# Patient Record
(demographics unavailable — no encounter records)

---

## 2024-12-10 NOTE — HISTORY OF PRESENT ILLNESS
[FreeTextEntry1] : Mr. TODD CANELA is a 73-year-old male who presents for follow up w/ regards to a hx of type 2 dm. The diabetes has been present for about 20+ years. He denies any history of retinopathy or nephropathy. With regard to neuropathy, he denies any neurologic signs and symptoms.   For the diabetes, he is currently taking Metformin 1000mg BiD and Glimepiride 1mg 1/2 tab- refilled by his pcp.  - Prior could not tolerate Mounjaro.   Home glucose monitoring has shown values to be running 100-150 in the AM, and up to 115 at bedtime.  He does deny any hypoglycemia or hypoglycemic signs or symptoms.  Diet: has pasta up to 2x weekly.   POCT A1C returned today at 6.9%.  POCT glucose today at 158 mg/dL. ---> had cereal with milk for breakfast.   He denies polyuria, polydipsia, or any visual changes. He too denies any skin lesions, skin breakdown or non-healing areas of skin. He too denies any podiatric concerns. Ophthalmologic evaluation is up to date- no diabetic changes noted, does have hx of b/l cataracts mild. ____________________________________________________________________________________________________  Additional medical history includes that of CAD NOCA, hyperlipidemia, hypertension, vitamin d deficiency, kidney stone   Additional Medications: Valsartan 160mg, Simvastatin 20mg, Ezetimibe 10mg, ASA 81 mg, Tamsulosin 0.4 mg  Supplements: zinc, iron  PMD in [place] Dr. Craig.  Cardiologist: Dr. Cain Mendoza. Had recent unremarkable nuclear stress test, echo, and carotid doppler.

## 2024-12-10 NOTE — HISTORY OF PRESENT ILLNESS
[FreeTextEntry1] : Mr. TODD CANELA is a 73-year-old male who presents for follow up w/ regards to a hx of type 2 dm. The diabetes has been present for about 20+ years. He denies any history of retinopathy or nephropathy. With regard to neuropathy, he denies any neurologic signs and symptoms.   For the diabetes, he is currently taking Metformin 1000mg BiD and Glimepiride 1mg 1/2 tab- refilled by his pcp.  - Prior could not tolerate Mounjaro.   Home glucose monitoring has shown values to be running 100-150 in the AM, and up to 115 at bedtime.  He does deny any hypoglycemia or hypoglycemic signs or symptoms.  Diet: has pasta up to 2x weekly.   POCT A1C returned today at 6.9%.  POCT glucose today at 158 mg/dL. ---> had cereal with milk for breakfast.   He denies polyuria, polydipsia, or any visual changes. He too denies any skin lesions, skin breakdown or non-healing areas of skin. He too denies any podiatric concerns. Ophthalmologic evaluation is up to date- no diabetic changes noted, does have hx of b/l cataracts mild. ____________________________________________________________________________________________________  Additional medical history includes that of CAD NOCA, hyperlipidemia, hypertension, vitamin d deficiency, kidney stone   Additional Medications: Valsartan 160mg, Simvastatin 20mg, Ezetimibe 10mg, ASA 81 mg, Tamsulosin 0.4 mg  Supplements: zinc, iron  PMD in [place] Dr. Craig.  Cardiologist: Dr. Cain Mendoza. Had recent unremarkable nuclear stress test, echo, and carotid doppler.

## 2024-12-10 NOTE — ADDENDUM
[FreeTextEntry1] : POCT glucose testing and Hemoglobin A1c was carried out today given diabetes diagnosis. Blood will be drawn in office today.  This note was written by Yuri Arenas on 12/10/2024 acting as medical scribe for Dr. Chiki Hartley. I, Dr. Chiki Hartley, have read and attest that all the information, medical decision making and discharge instructions within are true and accurate.

## 2025-02-26 NOTE — HISTORY OF PRESENT ILLNESS
[FreeTextEntry1] : Patient is a 72 y/o male with a PMHx of hypertension, CAD, Type 2 Diabetes Mellitus, hyperlipidemia, hyperkalemia, hyperlipemia, Nephrolithiasis, Vitamin D Deficiency, Iron Deficiency Anemia, Colon Diverticulosis, and Colon Polyps, who presents for concern due to Iron Deficiency Anemia shown on his most recent bloodwork. Bloodwork performed on 12/10/2024, ordered by pt's endocrinologist, Dr. Chiki Hartley, showed a decreased Iron level or 43, and a decreased HGB of 12.2. Pt is currently asymptomatic and feeling well. Last colonoscopy was 2/17/2022, which showed colon diverticulosis. Pt has never had an EGD.

## 2025-02-26 NOTE — PHYSICAL EXAM

## 2025-02-26 NOTE — ASSESSMENT
[FreeTextEntry1] : Referred pt for EGD-Colonoscopy.  Diverticulosis occurs when small, bulging sacs or pouches form on the inner wall of the intestine. These sacs are called diverticula. Most often, these pouches form in the large intestine (colon). They may also occur in the jejunum in the small intestine, although this is less common.   Causes  Diverticulosis is less common in people age 40 and younger. It's more common in older adults. About half of Americans over age 60 have this condition. Most people will have it by age 80.    No one knows exactly what causes these pouches to form.    For many years, it was thought that eating a low-fiber diet may play a role. Not eating enough fiber can cause constipation (hard stools). Straining to pass stools (feces) increases the pressure in the colon or intestines. This may cause the pouches to form at weak spots in the colon wall. However, whether a low-fiber diet leads to this problem is not well proven.    Other possible risk factors that are also not well proven are lack of exercise and obesity.    Eating nuts, popcorn, or corn does not appear to lead to inflammation of these pouches (diverticulitis).    Symptoms  Most people with diverticulosis have no symptoms.    When symptoms occur, they may include:    Pain and cramps in your stomach especially in the left lower abdomen  Constipation (sometimes diarrhea)  Bloating or gas  Not feeling hungry and not eating  You may notice small amounts of blood in your stools or on toilet paper. Rarely, more severe bleeding may occur.    Exams and Tests  Diverticulosis is often found during an exam for another health problem. For example, it is often discovered during a colonoscopy.    If you do have symptoms, you may have one or more of the following tests:    Blood tests to see if you have an infection or have lost too much blood  CT scan or ultrasound of the abdomen if you have bleeding, loose stools, or pain  A colonoscopy is needed to make the diagnosis:    A colonoscopy is an exam that views the inside of the colon and rectum. This test should not be done when you are having symptoms of acute diverticulitis.  A small camera attached to a tube can reach the length of the colon.  Angiography:    Angiography is an imaging test that uses x-rays and a special dye to see inside the blood vessels.  This test may be used if the area of bleeding is not seen during a colonoscopy.  Treatment  Because most people have no symptoms, most of the time, no treatment is needed.    Your health care provider may recommend getting more fiber in your diet. A high-fiber diet has many health benefits. Most people don't get enough fiber. To help prevent constipation, you should:    Eat plenty of whole grains, beans, fruits, and vegetables. Limit processed foods.  Drink plenty of fluids.  Get regular exercise.  Talk with your provider about taking a fiber supplement.  You should avoid NSAIDs such as aspirin, ibuprofen (Motrin), and naproxen (Aleve). These medicines can make bleeding more likely.    For bleeding that does not stop or recurs:    Colonoscopy may be used to inject medicines or burn a certain area in the intestine to stop the bleeding.  Angiography may be used to infuse medicines or block off a blood vessel.  If bleeding does not stop or recurs many times, removal of a section of the colon may be needed.    [place] (Prognosis)  Most people who have diverticulosis have no symptoms. Once these pouches have formed, you will have them for life.    Up to 25% of people with the condition will develop diverticulitis. This occurs when small pieces of stool become trapped in the pouches, causing infection or swelling.    Possible Complications  More serious problems that may develop include:    Abnormal connections that form between parts of the colon or between the colon and another part of the body (fistula)  Hole or tear in the colon (perforation)  Narrowed area in the colon (stricture)  Pockets filled with pus or infection (abscess)  When to Contact a Medical Professional  Call your provider if symptoms of diverticulitis occur.   An upper GI endoscopy or EGD (esophagogastroduodenoscopy) is a procedure to diagnose and treat problems in your upper GI (gastrointestinal) tract.  The upper GI tract includes your food pipe (esophagus), stomach, and the first part of your small intestine (the duodenum).  This procedure is done using a long, flexible tube called an endoscope. The tube has a tiny light and video camera on one end. The tube is put into your mouth and throat. Then it is slowly pushed through your esophagus and stomach, and into your duodenum. Video images from the tube are seen on a monitor.  Small tools may also be inserted into the endoscope. These tools can be used to:  Take tissue samples for a biopsy Remove things such as food that may be stuck in the upper GI tract Inject air or fluid Stop bleeding do procedures such as endoscopic surgery, laser therapy, or open (dilate) a narrowed area  A colonoscopy is an exam of the lower part of the gastrointestinal tract, which is called the colon or large intestine (bowel). Colonoscopy is a safe procedure that provides information that other tests may not be able to give.  Colonoscopy is performed by inserting a device called a colonoscope into the anus and advancing through the entire colon. The procedure generally takes between 20 minutes and one hour.  Other tests that are sometimes used to screen for colon cancer, like virtual colonoscopy (also called CT colonography), were discussed separately.  REASONS FOR COLONOSCOPY:  The most common reasons for colonoscopy are:  1. To screen for colon polyps (growths of tissue in the colon) or colon cancer  2. Rectal bleeding  3. A change in bowel habits, like persistent diarrhea  4. Iron deficiency anemia (a decrease in blood count due to loss of iron)  5. A family history of colon cancer  6. A personal history of colon polyps or colon cancer  7. Chronic, unexplained abdominal or rectal pain  8. An abnormal X-Ray exam, like a barium enema or CT scan.   COLONOSCOPY PREPARATION: Before colonoscopy, your colon must be completely cleaned out so that the doctor can see any abnormal areas. This is vitally important to increase the chances that your doctor will identify abnormalities in your colon. If your colon is not completely cleaned out, the chances your doctor will miss abnormalities increases. Your doctor's office will provide specific instructions about how you should prepare for your colonoscopy. Be sure to read these instructions as soon as you get them so you will know how to take the preparation and whether you need to make any changes to your medications or diet. If you have questions, call the doctor's office in advance.  I spent 35 minutes reviewing the patient's records prior to arrival, with the patient, and reviewing records after the visit. All prior testing reviewed at length. Patient verbalized understanding of all information provided. All questions answered and reviewed.  Robert Brunner, MD

## 2025-02-27 NOTE — PHYSICAL EXAM
[No Acute Distress] : no acute distress [Normal Oropharynx] : normal oropharynx [Normal Appearance] : normal appearance [No Neck Mass] : no neck mass [Normal Rate/Rhythm] : normal rate/rhythm [Normal S1, S2] : normal s1, s2 [No Murmurs] : no murmurs [No Resp Distress] : no resp distress [Clear to Auscultation Bilaterally] : clear to auscultation bilaterally [No Abnormalities] : no abnormalities [Benign] : benign [Normal Gait] : normal gait [No Clubbing] : no clubbing [No Cyanosis] : no cyanosis [No Edema] : no edema [Normal Color/ Pigmentation] : normal color/ pigmentation [No Focal Deficits] : no focal deficits [Oriented x3] : oriented x3 [Normal Affect] : normal affect [Normal to Percussion] : normal to percussion [No HSM] : no hsm [TextBox_2] : Overweight

## 2025-02-27 NOTE — ASSESSMENT
[FreeTextEntry1] : The pathophysiology as well as medical implications of untreated obstructive sleep apnea syndrome were discussed with this patient. Treatment options including CPAP therapy, Inspire,  mandibular advancement device and weight loss were also discussed. [facility] HSS Likely benefit from program of weight loss and conditioning. Cardiology evaluation. Patient will follow-up post sleep study for further recommendations or sooner on appearing basis. Do not see indication for bronchodilator therapy at this time.

## 2025-02-27 NOTE — HISTORY OF PRESENT ILLNESS
[Never] : never [Awakes with Dry Mouth] : awakes with dry mouth [Fatigue] : fatigue [Frequent Nocturnal Awakening] : frequent nocturnal awakening [Snoring] : snoring [Unintentional Sleep while Inactive] : unintentional sleep while inactive [TextBox_4] : TODD CANELA is a 73 year old  M referred for pulmonary evaluation for shortness of breath.  Difficulty with SOB. Noted SOB past few months. Variable. BLAIR if walks fast or climbs steps. BLAIR 40 steps.  Has seen cardiology going for testing.  No significant cough. Positive issue with saliva in throat. ? wheezing. No CP. No CXR or CT.  Overweight no change.   Past pulmonary history. N Occupational Exposure. N Family history of pulmonary disease. N Recent travel [place] last year.  Pets N [Awakes Unrefreshed] : does not awaken unrefreshed [Awakes with Headache] : does not awaken with headache [Tired while Driving] : not tired while driving

## 2025-02-27 NOTE — ASSESSMENT
[FreeTextEntry1] : The pathophysiology as well as medical implications of untreated obstructive sleep apnea syndrome were discussed with this patient. Treatment options including CPAP therapy, Inspire,  mandibular advancement device and weight loss were also discussed. [facility] HSS Likely benefit from program of weight loss and conditioning. Cardiology evaluation. Patient will follow-up post sleep study for further recommendations or sooner on appearing basis. Do not see indication for bronchodilator therapy at this time.

## 2025-02-27 NOTE — PROCEDURE
[FreeTextEntry1] : 02/26/2025 Pulmonary function testing There is a mild ventilatory impairment in a restrictive pattern.Normal Lung Volumes. There is a mild diffusion impairment.Corrects to normal with lung volume correction  Chest radiograph February 2025 reported negative chest.

## 2025-02-27 NOTE — DISCUSSION/SUMMARY
[FreeTextEntry1] : BLAIR.  Do not see evidence of significant underlying parenchymal lung or airways disease to explain exertional dyspnea.  Likely not pulmonary in origin.  Cannot exclude cardiac.  May be related to weight and conditioning. Likely suffers from PERLITA Restrictive physiology likely related to body habitus.

## 2025-03-12 NOTE — REASON FOR VISIT
[Follow-Up] : a follow-up visit [Shortness of Breath] : shortness of breath [Sleep Apnea] : sleep apnea

## 2025-03-13 NOTE — DISCUSSION/SUMMARY
[FreeTextEntry1] : PERLITA Very perlita AHI 79 RDI 84 and 44.3 percent desaturations BLAIR.  Do not see evidence of significant underlying parenchymal lung or airways disease to explain  exertional dyspnea.  Likely not pulmonary in origin.  Cannot exclude cardiac.  May be related to weight and conditioning.  Restrictive physiology likely related to body habitus.

## 2025-03-13 NOTE — PHYSICAL EXAM
[No Acute Distress] : no acute distress [Normal Oropharynx] : normal oropharynx [Normal Appearance] : normal appearance [No Neck Mass] : no neck mass [Normal Rate/Rhythm] : normal rate/rhythm [Normal S1, S2] : normal s1, s2 [No Murmurs] : no murmurs [No Resp Distress] : no resp distress [Clear to Auscultation Bilaterally] : clear to auscultation bilaterally [Normal to Percussion] : normal to percussion [No Abnormalities] : no abnormalities [Benign] : benign [No HSM] : no hsm [Normal Gait] : normal gait [No Clubbing] : no clubbing [No Cyanosis] : no cyanosis [No Edema] : no edema [Normal Color/ Pigmentation] : normal color/ pigmentation [No Focal Deficits] : no focal deficits [Oriented x3] : oriented x3 [Normal Affect] : normal affect [TextBox_2] : Overweight

## 2025-03-13 NOTE — HISTORY OF PRESENT ILLNESS
[Never] : never [Awakes with Dry Mouth] : awakes with dry mouth [Fatigue] : fatigue [Frequent Nocturnal Awakening] : frequent nocturnal awakening [Snoring] : snoring [Unintentional Sleep while Inactive] : unintentional sleep while inactive [TextBox_4] : Here for f/u  had recent 2-night HSS seeing Dr Cain Mendoza and has a Holter had echo told ok has dry mouth Difficulty with SOB on and off Noted SOB past few months. Variable. BLAIR if walks fast or climbs steps. BLAIR 40 steps.  No significant cough. Positive issue with saliva in throat. ? wheezing. No CP. No CXR or CT.  Overweight no change.   Past pulmonary history. N  [Awakes Unrefreshed] : does not awaken unrefreshed [Awakes with Headache] : does not awaken with headache [Tired while Driving] : not tired while driving

## 2025-03-13 NOTE — ASSESSMENT
[FreeTextEntry1] : will order cpap titration will f/u post titration Likely benefit from program of weight loss and conditioning. Cardiology f/u

## 2025-03-13 NOTE — PROCEDURE
[FreeTextEntry1] : 2-night HSS reviewed and treatment options  02/26/2025 Pulmonary function testing There is a mild ventilatory impairment in a restrictive pattern.Normal Lung Volumes. There is a mild diffusion impairment.Corrects to normal with lung volume correction  Chest radiograph February 2025 reported negative chest.

## 2025-05-03 NOTE — HISTORY OF PRESENT ILLNESS
[FreeTextEntry1] : Mr. TODD CANELA is a 73-year-old male who presents for follow up w/ regards to a hx of type 2 dm. The diabetes has been present for about 20+ years. He denies any history of retinopathy or nephropathy. With regard to neuropathy, he denies any neurologic signs and symptoms.   Additional medical history includes that of CAD NOCA, hyperlipidemia, hypertension, vitamin d deficiency, kidney stone   For the diabetes, he is currently taking Metformin 1000mg BiD and Glimepiride 1mg 1/2 tab- refilled by his pcp. Overall, tolerating the medications well. - Prior could not tolerate Mounjaro.   Home glucose monitoring has shown values to be running  He does deny any hypoglycemia or hypoglycemic signs or symptoms.  Diet: has pasta up to 2x weekly.   POCT A1C returned today at 7.2 %. Previously returned at 7% in December 2024. POCT glucose today at 148  mg/dL.  He denies polyuria, polydipsia, or any visual changes. He too denies any skin lesions, skin breakdown or non-healing areas of skin. He too denies any podiatric concerns. Ophthalmologic evaluation is up to date- no diabetic changes noted, does have hx of b/l cataracts mild. ____________________________________________________________________________________________________  Additional Medications: Valsartan 160mg, Simvastatin 20mg, Ezetimibe 10mg, ASA 81 mg, Tamsulosin 0.4 mg  Supplements: zinc, iron  PMD in [place] Dr. Craig.  Cardiologist: Dr. Cain Mendoza. Had recent unremarkable nuclear stress test, echo, and carotid doppler.

## 2025-05-03 NOTE — HISTORY OF PRESENT ILLNESS
[FreeTextEntry1] : Mr. TODD CANELA is a 73-year-old male who presents for follow up w/ regards to a hx of type 2 dm. The diabetes has been present for about 20+ years. He denies any history of retinopathy or nephropathy. With regard to neuropathy, he denies any neurologic signs and symptoms.   Additional medical history includes that of CAD NOCA, hyperlipidemia, hypertension, vitamin d deficiency, kidney stone   For the diabetes, he is currently taking Metformin 1000mg BiD and Glimepiride 1mg 1/2 tab- refilled by his pcp. Overall, tolerating the medications well. - Prior could not tolerate Mounjaro.   Home glucose monitoring has shown values to be running  He does deny any hypoglycemia or hypoglycemic signs or symptoms.  Diet: has pasta up to 2x weekly.   POCT A1C returned today at 7.2 %. Previously returned at 7% in December 2024. POCT glucose today at 148  mg/dL.  He denies polyuria, polydipsia, or any visual changes. He too denies any skin lesions, skin breakdown or non-healing areas of skin. He too denies any podiatric concerns. Ophthalmologic evaluation is up to date- no diabetic changes noted, does have hx of b/l cataracts mild. ____________________________________________________________________________________________________  Additional Medications: Valsartan 160mg, Simvastatin 20mg, Ezetimibe 10mg, ASA 81 mg, Tamsulosin 0.4 mg  Supplements: zinc, iron  PMD in [place] Dr. Craig.  Cardiologist: Dr. Cain Mendoza. Had recent unremarkable nuclear stress test, echo, and carotid doppler.

## 2025-05-09 NOTE — HISTORY OF PRESENT ILLNESS
[Never] : never [Awakes with Dry Mouth] : awakes with dry mouth [Fatigue] : fatigue [Frequent Nocturnal Awakening] : frequent nocturnal awakening [Snoring] : snoring [Unintentional Sleep while Inactive] : unintentional sleep while inactive [TextBox_4] : Had titration study Needs BiPAP No other significant change in status    [Awakes Unrefreshed] : does not awaken unrefreshed [Awakes with Headache] : does not awaken with headache [Tired while Driving] : not tired while driving

## 2025-05-09 NOTE — PROCEDURE
[FreeTextEntry1] : discussed titration study  02/26/2025 Pulmonary function testing There is a mild ventilatory impairment in a restrictive pattern.Normal Lung Volumes. There is a mild diffusion impairment.Corrects to normal with lung volume correction  Chest radiograph February 2025 reported negative chest.

## 2025-05-09 NOTE — ASSESSMENT
[FreeTextEntry1] : start bipap ResMed with Ipap 14 Epap10 PS 2 with a dream silicone pillow to fit r/t 2 months for compliance Likely benefit from program of weight loss and conditioning. Cardiology f/u

## 2025-07-30 NOTE — DISCUSSION/SUMMARY
[FreeTextEntry1] : PERLITA Very perlita AHI 79 RDI 84 and 44.3 percent desaturations titrated to bipap BLAIR.  Do not see evidence of significant underlying parenchymal lung or airways disease to explain  exertional dyspnea.  Likely not pulmonary in origin.  Cannot exclude cardiac.  May be related to weight and conditioning.  Restrictive physiology likely related to body habitus.

## 2025-07-30 NOTE — HISTORY OF PRESENT ILLNESS
[FreeTextEntry1] : Patient is a 72 y/o male with a PMHx of hypertension, CAD, Type 2 Diabetes Mellitus, hyperlipidemia, hyperkalemia, hyperlipemia, Nephrolithiasis, Vitamin D Deficiency, Iron Deficiency Anemia, GERD, Gastritis, Reactive Gastropathy, Sigmoid Diverticulosis, and Colon Polyps, currently on Omeprazole 40 mg once daily, who presents c/o bloating. Pt takes iron pills 50 mg BID. He denies any constipation. Pt had blood work today by his endocrinologist which is still pending.  Bloodwork performed on 12/10/2024, ordered by pt's endocrinologist, Dr. Chiki Hartley, showed a decreased Iron level or 43, and a decreased HGB of 12.2.  Last ECO was performed on 4/8/2025. EGD showed gastritis, reactive gastropathy, and was negative for H. Pylori. Colonoscopy showed Sigmoid Diverticulosis. Pt was instructed to f/u in 5 years for next Colonoscopy.

## 2025-07-30 NOTE — ASSESSMENT
[FreeTextEntry1] : Pt was advised to stop Omeprazole  Famotidine 40 mg was ordered for pt Will f/u with CBC and FE from endocrinologist  continue taking iron pills  Pt was advised to f/u in office in 2 weeks    1. GI HEALTH   FODMAP stands for fermentable oligosaccharides, disaccharides, monosaccharides, and polyols, which are short-chain carbohydrates (sugars) that the small intestine absorbs poorly. Some people experience digestive distress after eating them. Symptoms include: Cramping Diarrhea Constipation Abdominal Bloating Gas and Flatulence     2. EXERCISE   Physical activity increases blood flow to the muscles in the digestive system, which massage our food along the digestive tract, a process known as peristalsis, causing them to work more quickly and effectively. Research also suggests that exercise affects the balance of bacteria in the gut.     3. BLOATING   Abdominal cramping, excessive gas, and sharp pains in the abdomen can all be symptoms a patient endures when suffering from stomach bloating. Stomach bloating occurs when gas fails to leave the body either through flatulence or belching, but instead builds up inside the intestines or stomach and results in mild to severe bloating. Bloating is related to stress levels, gastrointestinal issues, smoking, and processed foods. Fatty foods, such as hamburgers, fried chicken, and desserts, often contain saturated and trans fats, which may increase the full feeling a patient may experience.  A low acid/reflux diet was discussed in great detail including not smoking, not drinking alcohol, and not consuming foods that irritate the esophagus. It is helpful to eat small meals throughout the day instead of large meals. You should avoid eating before bedtime or lying down after you eat. It can be helpful to raise the head of your bed six inches. Additionally, you should maintain a healthy weight and good posture. The patient was given written material to take home and review.  Diverticulosis occurs when small, bulging sacs or pouches form on the inner wall of the intestine. These sacs are called diverticula. Most often, these pouches form in the large intestine (colon). They may also occur in the jejunum in the small intestine, although this is less common.     Causes   Diverticulosis is less common in people age 40 and younger. It's more common in older adults. About half of Americans over age 60 have this condition. Most people will have it by age 80.       No one knows exactly what causes these pouches to form.       For many years, it was thought that eating a low-fiber diet may play a role. Not eating enough fiber can cause constipation (hard stools). Straining to pass stools (feces) increases the pressure in the colon or intestines. This may cause the pouches to form at weak spots in the colon wall. However, whether a low-fiber diet leads to this problem is not well proven.       Other possible risk factors that are also not well proven are lack of exercise and obesity.       Eating nuts, popcorn, or corn does not appear to lead to inflammation of these pouches (diverticulitis).       Symptoms   Most people with diverticulosis have no symptoms.       When symptoms occur, they may include:       Pain and cramps in your stomach especially in the left lower abdomen   Constipation (sometimes diarrhea)   Bloating or gas   Not feeling hungry and not eating   You may notice small amounts of blood in your stools or on toilet paper. Rarely, more severe bleeding may occur.       Exams and Tests   Diverticulosis is often found during an exam for another health problem. For example, it is often discovered during a colonoscopy.       If you do have symptoms, you may have one or more of the following tests:       Blood tests to see if you have an infection or have lost too much blood   CT scan or ultrasound of the abdomen if you have bleeding, loose stools, or pain   A colonoscopy is needed to make the diagnosis:       A colonoscopy is an exam that views the inside of the colon and rectum. This test should not be done when you are having symptoms of acute diverticulitis.   A small camera attached to a tube can reach the length of the colon.   Angiography:       Angiography is an imaging test that uses x-rays and a special dye to see inside the blood vessels.   This test may be used if the area of bleeding is not seen during a colonoscopy.   Treatment   Because most people have no symptoms, most of the time, no treatment is needed.       Your health care provider may recommend getting more fiber in your diet. A high-fiber diet has many health benefits. Most people don't get enough fiber. To help prevent constipation, you should:       Eat plenty of whole grains, beans, fruits, and vegetables. Limit processed foods.   Drink plenty of fluids.   Get regular exercise.   Talk with your provider about taking a fiber supplement.   You should avoid NSAIDs such as aspirin, ibuprofen (Motrin), and naproxen (Aleve). These medicines can make bleeding more likely.       For bleeding that does not stop or recurs:       Colonoscopy may be used to inject medicines or burn a certain area in the intestine to stop the bleeding.   Angiography may be used to infuse medicines or block off a blood vessel.   If bleeding does not stop or recurs many times, removal of a section of the colon may be needed.       [place] (Prognosis)   Most people who have diverticulosis have no symptoms. Once these pouches have formed, you will have them for life.       Up to 25% of people with the condition will develop diverticulitis. This occurs when small pieces of stool become trapped in the pouches, causing infection or swelling.       Possible Complications   More serious problems that may develop include:       Abnormal connections that form between parts of the colon or between the colon and another part of the body (fistula)   Hole or tear in the colon (perforation)   Narrowed area in the colon (stricture)   Pockets filled with pus or infection (abscess)   When to Contact a Medical Professional   Call your provider if symptoms of diverticulitis occur.   I spent 35 minutes reviewing the patient's records prior to arrival, with the patient, and reviewing records after the visit. All prior testing reviewed at length. Patient verbalized understanding of all information provided. All questions answered and reviewed.  Robert Brunner, MD

## 2025-07-30 NOTE — HISTORY OF PRESENT ILLNESS
[Never] : never [Awakes with Dry Mouth] : awakes with dry mouth [Fatigue] : fatigue [Frequent Nocturnal Awakening] : frequent nocturnal awakening [Snoring] : snoring [Unintentional Sleep while Inactive] : unintentional sleep while inactive [TextBox_4] : using [employer] [facility] for f/u with new BiPAP machine with nasal pillows large using regularly with benefit did not bring on vacation very dry mouth and DME sent medium pillows instead of large No other significant change in status    [Awakes Unrefreshed] : does not awaken unrefreshed [Awakes with Headache] : does not awaken with headache [Tired while Driving] : not tired while driving

## 2025-07-30 NOTE — PHYSICAL EXAM

## 2025-07-30 NOTE — PROCEDURE
[FreeTextEntry1] : bipap data downloaded and discussed with patient  02/26/2025 Pulmonary function testing There is a mild ventilatory impairment in a restrictive pattern.Normal Lung Volumes. There is a mild diffusion impairment.Corrects to normal with lung volume correction  Chest radiograph February 2025 reported negative chest.

## 2025-07-30 NOTE — ASSESSMENT
[FreeTextEntry1] : cont. bipap ResMed with Ipap 14 Epap10 PS 2 with a dream silicone pillow to fit Patient compliant to bipap therapy and having positive clinical response to treatment. Continue present settings. Likely benefit from program of weight loss and conditioning. failed [place] in past, bad side effects gave sample of ResMed x30 i to try to increase humidification will get large nasal pillows r/t 6 months for f/u

## 2025-07-30 NOTE — ASSESSMENT
[FreeTextEntry1] : Pt was advised to stop Omeprazole  Famotidine 40 mg was ordered for pt Will f/u with CBC and FE from endocrinologist  continue taking iron pills  Pt was advised to f/u in office in 2 weeks    1. GI HEALTH   FODMAP stands for fermentable oligosaccharides, disaccharides, monosaccharides, and polyols, which are short-chain carbohydrates (sugars) that the small intestine absorbs poorly. Some people experience digestive distress after eating them. Symptoms include: Cramping Diarrhea Constipation Abdominal Bloating Gas and Flatulence     2. EXERCISE   Physical activity increases blood flow to the muscles in the digestive system, which massage our food along the digestive tract, a process known as peristalsis, causing them to work more quickly and effectively. Research also suggests that exercise affects the balance of bacteria in the gut.     3. BLOATING   Abdominal cramping, excessive gas, and sharp pains in the abdomen can all be symptoms a patient endures when suffering from stomach bloating. Stomach bloating occurs when gas fails to leave the body either through flatulence or belching, but instead builds up inside the intestines or stomach and results in mild to severe bloating. Bloating is related to stress levels, gastrointestinal issues, smoking, and processed foods. Fatty foods, such as hamburgers, fried chicken, and desserts, often contain saturated and trans fats, which may increase the full feeling a patient may experience.  A low acid/reflux diet was discussed in great detail including not smoking, not drinking alcohol, and not consuming foods that irritate the esophagus. It is helpful to eat small meals throughout the day instead of large meals. You should avoid eating before bedtime or lying down after you eat. It can be helpful to raise the head of your bed six inches. Additionally, you should maintain a healthy weight and good posture. The patient was given written material to take home and review.  Diverticulosis occurs when small, bulging sacs or pouches form on the inner wall of the intestine. These sacs are called diverticula. Most often, these pouches form in the large intestine (colon). They may also occur in the jejunum in the small intestine, although this is less common.     Causes   Diverticulosis is less common in people age 40 and younger. It's more common in older adults. About half of Americans over age 60 have this condition. Most people will have it by age 80.       No one knows exactly what causes these pouches to form.       For many years, it was thought that eating a low-fiber diet may play a role. Not eating enough fiber can cause constipation (hard stools). Straining to pass stools (feces) increases the pressure in the colon or intestines. This may cause the pouches to form at weak spots in the colon wall. However, whether a low-fiber diet leads to this problem is not well proven.       Other possible risk factors that are also not well proven are lack of exercise and obesity.       Eating nuts, popcorn, or corn does not appear to lead to inflammation of these pouches (diverticulitis).       Symptoms   Most people with diverticulosis have no symptoms.       When symptoms occur, they may include:       Pain and cramps in your stomach especially in the left lower abdomen   Constipation (sometimes diarrhea)   Bloating or gas   Not feeling hungry and not eating   You may notice small amounts of blood in your stools or on toilet paper. Rarely, more severe bleeding may occur.       Exams and Tests   Diverticulosis is often found during an exam for another health problem. For example, it is often discovered during a colonoscopy.       If you do have symptoms, you may have one or more of the following tests:       Blood tests to see if you have an infection or have lost too much blood   CT scan or ultrasound of the abdomen if you have bleeding, loose stools, or pain   A colonoscopy is needed to make the diagnosis:       A colonoscopy is an exam that views the inside of the colon and rectum. This test should not be done when you are having symptoms of acute diverticulitis.   A small camera attached to a tube can reach the length of the colon.   Angiography:       Angiography is an imaging test that uses x-rays and a special dye to see inside the blood vessels.   This test may be used if the area of bleeding is not seen during a colonoscopy.   Treatment   Because most people have no symptoms, most of the time, no treatment is needed.       Your health care provider may recommend getting more fiber in your diet. A high-fiber diet has many health benefits. Most people don't get enough fiber. To help prevent constipation, you should:       Eat plenty of whole grains, beans, fruits, and vegetables. Limit processed foods.   Drink plenty of fluids.   Get regular exercise.   Talk with your provider about taking a fiber supplement.   You should avoid NSAIDs such as aspirin, ibuprofen (Motrin), and naproxen (Aleve). These medicines can make bleeding more likely.       For bleeding that does not stop or recurs:       Colonoscopy may be used to inject medicines or burn a certain area in the intestine to stop the bleeding.   Angiography may be used to infuse medicines or block off a blood vessel.   If bleeding does not stop or recurs many times, removal of a section of the colon may be needed.       [place] (Prognosis)   Most people who have diverticulosis have no symptoms. Once these pouches have formed, you will have them for life.       Up to 25% of people with the condition will develop diverticulitis. This occurs when small pieces of stool become trapped in the pouches, causing infection or swelling.       Possible Complications   More serious problems that may develop include:       Abnormal connections that form between parts of the colon or between the colon and another part of the body (fistula)   Hole or tear in the colon (perforation)   Narrowed area in the colon (stricture)   Pockets filled with pus or infection (abscess)   When to Contact a Medical Professional   Call your provider if symptoms of diverticulitis occur.   I spent 35 minutes reviewing the patient's records prior to arrival, with the patient, and reviewing records after the visit. All prior testing reviewed at length. Patient verbalized understanding of all information provided. All questions answered and reviewed.  Robert Brunner, MD

## 2025-07-30 NOTE — PHYSICAL EXAM

## 2025-08-13 NOTE — HISTORY OF PRESENT ILLNESS
[FreeTextEntry1] : Mr. TODD CANELA is a 73-year-old male who presents for follow up w/ regards to a hx of type 2 dm. The diabetes has been present for about 20+ years. He denies any history of retinopathy or nephropathy. With regard to neuropathy, he denies any neurologic signs and symptoms.   Additional medical history includes that of CAD NOCA, hyperlipidemia, hypertension, vitamin d deficiency, kidney stone, PERLITA (on CPAP) - Recently underwent a procedure to remove kidney stone. Currently has no kidney stones.  For the diabetes, he is currently taking Metformin 1000 mg BiD and Glimepiride 1 mg 1/2 tab- refilled by his pcp. Overall, tolerating the medications well. - Prior could not tolerate Mounjaro.   Home glucose monitoring has shown values to be running 118-135 AM and 120 at night. He does deny any hypoglycemia or hypoglycemic signs or symptoms.  Diet: has pasta up to 2x weekly.   POCT A1C returned today at 6.5%, previously 7.2% on 04/24/25. POCT glucose today at 171 mg/dL.  He denies polyuria, polydipsia, or any visual changes. He too denies any skin lesions, skin breakdown or non-healing areas of skin. He too denies any podiatric concerns. Ophthalmologic evaluation is DUE, does have hx of b/l cataracts mild. ____________________________________________________________________________________________________  Additional Medications: Valsartan 160 mg, Simvastatin 20 mg, Ezetimibe 10 mg, ASA 81 mg, Tamsulosin 0.4 mg   Supplements: zinc, iron  PMD in [place] Dr. Craig.  Cardiologist: Dr. Cain Mendoza. Had recent unremarkable nuclear stress test, echo, and carotid doppler.

## 2025-08-13 NOTE — ADDENDUM
[FreeTextEntry1] : POCT glucose testing and Hemoglobin A1c was carried out today given diabetes diagnosis. Blood will be drawn in office today.  This note was written by Vivian Woods on 07/30/2025 acting as medical scribe for Dr. Chiki Hartley. I, Dr. Chiki Hartley, have read and attest that all the information, medical decision making and discharge instructions within are true and accurate.

## 2025-08-13 NOTE — HISTORY OF PRESENT ILLNESS
[FreeTextEntry1] : Mr. TODD CANELA is a 73-year-old male who presents for follow up w/ regards to a hx of type 2 dm. The diabetes has been present for about 20+ years. He denies any history of retinopathy or nephropathy. With regard to neuropathy, he denies any neurologic signs and symptoms.   Additional medical history includes that of CAD NOCA, hyperlipidemia, hypertension, vitamin d deficiency, kidney stone, PERLITA (on CPAP) - Recently underwent a procedure to remove kidney stone. Currently has no kidney stones.  For the diabetes, he is currently taking Metformin 1000 mg BiD and Glimepiride 1 mg 1/2 tab- refilled by his pcp. Overall, tolerating the medications well. - Prior could not tolerate Mounjaro.   Home glucose monitoring has shown values to be running 118-135 AM and 120 at night. He does deny any hypoglycemia or hypoglycemic signs or symptoms.  Diet: has pasta up to 2x weekly.   POCT A1C returned today at 6.5%, previously 7.2% on 04/24/25. POCT glucose today at 171 mg/dL.  He denies polyuria, polydipsia, or any visual changes. He too denies any skin lesions, skin breakdown or non-healing areas of skin. He too denies any podiatric concerns. Ophthalmologic evaluation is DUE, does have hx of b/l cataracts mild. ____________________________________________________________________________________________________  Additional Medications: Valsartan 160 mg, Simvastatin 20 mg, Ezetimibe 10 mg, ASA 81 mg, Tamsulosin 0.4 mg   Supplements: zinc, iron  PMD in [place] Dr. Craig.  Cardiologist: Dr. Cain Mendoza. Had recent unremarkable nuclear stress test, echo, and carotid doppler.